# Patient Record
Sex: MALE | Race: WHITE
[De-identification: names, ages, dates, MRNs, and addresses within clinical notes are randomized per-mention and may not be internally consistent; named-entity substitution may affect disease eponyms.]

---

## 2018-10-03 ENCOUNTER — HOSPITAL ENCOUNTER (OUTPATIENT)
Age: 68
Setting detail: OBSERVATION
LOS: 1 days | Discharge: HOME | End: 2018-10-04

## 2018-10-03 ENCOUNTER — HOSPITAL ENCOUNTER (OUTPATIENT)
Dept: HOSPITAL 91 - REC | Age: 68
Setting detail: OBSERVATION
LOS: 1 days | Discharge: HOME | End: 2018-10-04
Payer: MEDICARE

## 2018-10-03 DIAGNOSIS — M48.061: Primary | ICD-10-CM

## 2018-10-03 DIAGNOSIS — Z85.118: ICD-10-CM

## 2018-10-03 PROCEDURE — 88311 DECALCIFY TISSUE: CPT

## 2018-10-03 PROCEDURE — 99217: CPT

## 2018-10-03 PROCEDURE — 72020 X-RAY EXAM OF SPINE 1 VIEW: CPT

## 2018-10-03 PROCEDURE — 86920 COMPATIBILITY TEST SPIN: CPT

## 2018-10-03 PROCEDURE — 85018 HEMOGLOBIN: CPT

## 2018-10-03 PROCEDURE — 97110 THERAPEUTIC EXERCISES: CPT

## 2018-10-03 PROCEDURE — 86901 BLOOD TYPING SEROLOGIC RH(D): CPT

## 2018-10-03 PROCEDURE — 94664 DEMO&/EVAL PT USE INHALER: CPT

## 2018-10-03 PROCEDURE — 88304 TISSUE EXAM BY PATHOLOGIST: CPT

## 2018-10-03 PROCEDURE — 63047 LAM FACETEC & FORAMOT LUMBAR: CPT

## 2018-10-03 PROCEDURE — 86850 RBC ANTIBODY SCREEN: CPT

## 2018-10-03 PROCEDURE — 97161 PT EVAL LOW COMPLEX 20 MIN: CPT

## 2018-10-03 PROCEDURE — 97116 GAIT TRAINING THERAPY: CPT

## 2018-10-03 PROCEDURE — 86900 BLOOD TYPING SEROLOGIC ABO: CPT

## 2018-10-03 PROCEDURE — 85014 HEMATOCRIT: CPT

## 2018-10-03 PROCEDURE — 97530 THERAPEUTIC ACTIVITIES: CPT

## 2018-10-03 PROCEDURE — 80048 BASIC METABOLIC PNL TOTAL CA: CPT

## 2018-10-03 RX ADMIN — CEFAZOLIN 1 MLS/HR: 1 INJECTION, POWDER, FOR SOLUTION INTRAMUSCULAR; INTRAVENOUS at 18:10

## 2018-10-03 RX ADMIN — BACITRACIN 1 ML: 50000 INJECTION, POWDER, FOR SOLUTION INTRAMUSCULAR at 10:51

## 2018-10-03 RX ADMIN — LEVALBUTEROL 1 MG: 1.25 SOLUTION, CONCENTRATE RESPIRATORY (INHALATION) at 13:18

## 2018-10-03 RX ADMIN — ONDANSETRON HYDROCHLORIDE 1 MG: 2 INJECTION, SOLUTION INTRAMUSCULAR; INTRAVENOUS at 18:11

## 2018-10-03 RX ADMIN — ASCORBIC ACID, VITAMIN A PALMITATE, CHOLECALCIFEROL, THIAMINE HYDROCHLORIDE, RIBOFLAVIN-5 PHOSPHATE SODIUM, PYRIDOXINE HYDROCHLORIDE, NIACINAMIDE, DEXPANTHENOL, ALPHA-TOCOPHEROL ACETATE, VITAMIN K1, FOLIC ACID, BIOTIN, CYANOCOBALAMIN 1 MLS/HR: 200; 3300; 200; 6; 3.6; 6; 40; 15; 10; 150; 600; 60; 5 INJECTION, SOLUTION INTRAVENOUS at 16:14

## 2018-10-03 RX ADMIN — FENTANYL CITRATE 1 MCG: 50 INJECTION, SOLUTION INTRAMUSCULAR; INTRAVENOUS at 13:11

## 2018-10-03 RX ADMIN — BENZOCAINE, MENTHOL 1 LOZENGE: 15; 3.6 LOZENGE ORAL at 16:21

## 2018-10-03 RX ADMIN — Medication 1 MG: at 13:26

## 2018-10-03 RX ADMIN — BUPIVACAINE HYDROCHLORIDE 1 ML: 2.5 INJECTION, SOLUTION EPIDURAL; INFILTRATION; INTRACAUDAL; PERINEURAL at 10:51

## 2018-10-03 RX ADMIN — ASCORBIC ACID, VITAMIN A PALMITATE, CHOLECALCIFEROL, THIAMINE HYDROCHLORIDE, RIBOFLAVIN-5 PHOSPHATE SODIUM, PYRIDOXINE HYDROCHLORIDE, NIACINAMIDE, DEXPANTHENOL, ALPHA-TOCOPHEROL ACETATE, VITAMIN K1, FOLIC ACID, BIOTIN, CYANOCOBALAMIN 1 MLS/HR: 200; 3300; 200; 6; 3.6; 6; 40; 15; 10; 150; 600; 60; 5 INJECTION, SOLUTION INTRAVENOUS at 22:58

## 2018-10-03 RX ADMIN — RANITIDINE 1 MG: 150 TABLET ORAL at 21:08

## 2018-10-03 RX ADMIN — HYDROMORPHONE HYDROCHLORIDE 1 MG: 2 INJECTION INTRAMUSCULAR; INTRAVENOUS; SUBCUTANEOUS at 13:18

## 2018-10-03 RX ADMIN — CEFAZOLIN SODIUM 1 MLS/HR: 2 SOLUTION INTRAVENOUS at 10:25

## 2018-10-04 LAB
ANION GAP: 12 (ref 8–16)
BLOOD UREA NITROGEN: 15 MG/DL (ref 7–20)
CALCIUM: 9.1 MG/DL (ref 8.4–10.2)
CARBON DIOXIDE: 26 MMOL/L (ref 21–31)
CHLORIDE: 106 MMOL/L (ref 97–110)
CREATININE: 0.81 MG/DL (ref 0.61–1.24)
GLUCOSE: 112 MG/DL (ref 70–220)
HEMATOCRIT: 36.1 % (ref 42–52)
HEMOGLOBIN: 12.4 G/DL (ref 14–18)
POTASSIUM: 3.8 MMOL/L (ref 3.5–5.1)
SODIUM: 140 MMOL/L (ref 135–144)

## 2018-10-04 RX ADMIN — FERROUS SULFATE TAB 325 MG (65 MG ELEMENTAL FE) 1 MG: 325 (65 FE) TAB at 09:15

## 2018-10-04 RX ADMIN — ASCORBIC ACID, VITAMIN A PALMITATE, CHOLECALCIFEROL, THIAMINE HYDROCHLORIDE, RIBOFLAVIN-5 PHOSPHATE SODIUM, PYRIDOXINE HYDROCHLORIDE, NIACINAMIDE, DEXPANTHENOL, ALPHA-TOCOPHEROL ACETATE, VITAMIN K1, FOLIC ACID, BIOTIN, CYANOCOBALAMIN 1 MLS/HR: 200; 3300; 200; 6; 3.6; 6; 40; 15; 10; 150; 600; 60; 5 INJECTION, SOLUTION INTRAVENOUS at 05:01

## 2018-10-04 RX ADMIN — OXYCODONE HYDROCHLORIDE AND ACETAMINOPHEN 1 MG: 500 TABLET ORAL at 09:15

## 2018-10-04 RX ADMIN — ASCORBIC ACID, VITAMIN A PALMITATE, CHOLECALCIFEROL, THIAMINE HYDROCHLORIDE, RIBOFLAVIN-5 PHOSPHATE SODIUM, PYRIDOXINE HYDROCHLORIDE, NIACINAMIDE, DEXPANTHENOL, ALPHA-TOCOPHEROL ACETATE, VITAMIN K1, FOLIC ACID, BIOTIN, CYANOCOBALAMIN 1 MLS/HR: 200; 3300; 200; 6; 3.6; 6; 40; 15; 10; 150; 600; 60; 5 INJECTION, SOLUTION INTRAVENOUS at 08:58

## 2018-10-04 RX ADMIN — DOCUSATE SODIUM 1 MG: 100 CAPSULE, LIQUID FILLED ORAL at 09:15

## 2018-10-04 RX ADMIN — PANTOPRAZOLE SODIUM 1 MG: 40 TABLET, DELAYED RELEASE ORAL at 05:24

## 2018-10-04 RX ADMIN — CEFAZOLIN 1 MLS/HR: 1 INJECTION, POWDER, FOR SOLUTION INTRAMUSCULAR; INTRAVENOUS at 00:10

## 2018-10-04 RX ADMIN — ZOLPIDEM TARTRATE 1 MG: 5 TABLET, FILM COATED ORAL at 00:10

## 2018-10-04 RX ADMIN — HYDROCODONE BITARTRATE AND ACETAMINOPHEN 1 TAB: 5; 325 TABLET ORAL at 09:16

## 2018-10-04 RX ADMIN — RANITIDINE 1 MG: 150 TABLET ORAL at 09:15

## 2018-10-04 RX ADMIN — CEFAZOLIN 1 MLS/HR: 1 INJECTION, POWDER, FOR SOLUTION INTRAMUSCULAR; INTRAVENOUS at 05:25

## 2018-12-27 ENCOUNTER — HOSPITAL ENCOUNTER (EMERGENCY)
Dept: HOSPITAL 10 - FTE | Age: 68
Discharge: HOME | End: 2018-12-27
Payer: MEDICARE

## 2018-12-27 ENCOUNTER — HOSPITAL ENCOUNTER (EMERGENCY)
Dept: HOSPITAL 91 - FTE | Age: 68
Discharge: HOME | End: 2018-12-27
Payer: MEDICARE

## 2018-12-27 VITALS — DIASTOLIC BLOOD PRESSURE: 77 MMHG | HEART RATE: 56 BPM | RESPIRATION RATE: 20 BRPM | SYSTOLIC BLOOD PRESSURE: 172 MMHG

## 2018-12-27 VITALS
HEIGHT: 68 IN | WEIGHT: 171.3 LBS | WEIGHT: 171.3 LBS | BODY MASS INDEX: 25.96 KG/M2 | BODY MASS INDEX: 25.96 KG/M2 | HEIGHT: 68 IN

## 2018-12-27 DIAGNOSIS — Z85.118: ICD-10-CM

## 2018-12-27 DIAGNOSIS — M79.605: Primary | ICD-10-CM

## 2018-12-27 PROCEDURE — 93971 EXTREMITY STUDY: CPT

## 2018-12-27 PROCEDURE — 99284 EMERGENCY DEPT VISIT MOD MDM: CPT

## 2018-12-27 NOTE — ERD
ER Documentation


Chief Complaint


Chief Complaint





Sent from MD for eval





HPI


68-year-old male who presents with right calf pain.  He was sent here by 


orthopedic to rule out DVT recommending ultrasound.  He is status post 


decompressive laminectomy at L4 with excision of bilateral facet cyst at L4-L5 


which was performed on October 3.  [place]. 


2 days after coming back from traveling he began to have pain in his right lower


extremity and calf.  He also has mild dry cough.  No fever.  No chest pain 


palpitations or shortness of breath.





ROS


All systems reviewed and are negative except as per history of present illness.





Medications


Home Meds


Reported Medications


Omeprazole* (Omeprazole*) 40 Mg Capsule.dr, 40 MG PO DAILY, #30 CAP


   10/2/18





Allergies


Allergies:  


Coded Allergies:  


     No Known Allergy (Verified , 10/3/18)





PMhx/Soc


History of Surgery:  Yes (rt knee repl. laminectomy)


Anesthesia Reaction:  No


Hx Neurological Disorder:  No


Hx Respiratory Disorders:  No


Hx Cardiac Disorders:  No


Hx Psychiatric Problems:  No


Hx Miscellaneous Medical Probl:  Yes (GERD, BPH, lung CA, spinal stenosis )


Hx Alcohol Use:  No


Hx Substance Use:  No


Hx Tobacco Use:  No





FmHx


Family History:  No diabetes





Physical Exam


Vitals





Vital Signs


  Date      Temp  Pulse  Resp  B/P (MAP)   Pulse Ox  O2          O2 Flow    FiO2


Time                                                 Delivery    Rate


  12/27/18  97.5     56    20      172/77        99


     15:00                          (108)





Physical Exam


Const:   No acute distress


Head:   Atraumatic 


Eyes:    Normal Conjunctiva


ENT:    Normal External Ears, Nose and Mouth.


Neck:               Full range of motion. No meningismus.


Resp:   Clear to auscultation bilaterally


Cardio:   Regular rate and rhythm, no murmurs


Right lower extremity, calf is slightly more swollen and indurated when compared


to the left, no erythema or warmth, sensation to light touch is intact





Procedures/MDM


Patient sent here to rule out DVT in the right lower extremity.  DVT ultrasound 


ordered.  Blood pressure elevated otherwise vital signs within normal limits.  


Ultrasound is negative.  I informed the patient's orthopedic doctor and fax him 


a copy as well as provided a copy for the patient.  Patient counseled regarding 


my diagnostic impression and care plan. Prior to discharge all questions 


answered. Pt agrees with treatment plan and understands strict return 


precautions. Pt is instructed to follow up with primary care provider within 24-


48 hours. Precautionary instructions provided including instructions to return 


to the ER if not improving or for any worsening or changing symptoms or 


concerns.





Departure


Diagnosis:  


   Primary Impression:  


   Leg pain


Condition:  Stable











MEGAN VINES PA-C            Dec 27, 2018 17:57

## 2019-06-25 ENCOUNTER — OFFICE (OUTPATIENT)
Dept: URBAN - METROPOLITAN AREA CLINIC 45 | Facility: CLINIC | Age: 69
End: 2019-06-25

## 2019-06-25 VITALS
WEIGHT: 165 LBS | HEIGHT: 69 IN | HEART RATE: 59 BPM | DIASTOLIC BLOOD PRESSURE: 68 MMHG | SYSTOLIC BLOOD PRESSURE: 132 MMHG

## 2019-06-25 DIAGNOSIS — Z86.010 PERSONAL HISTORY COLON POLYPS: ICD-10-CM

## 2019-06-25 DIAGNOSIS — K21.9 ACID REFLUX: ICD-10-CM

## 2019-06-25 PROCEDURE — 99203 OFFICE O/P NEW LOW 30 MIN: CPT | Performed by: INTERNAL MEDICINE

## 2021-04-06 ENCOUNTER — OFFICE (OUTPATIENT)
Dept: URBAN - METROPOLITAN AREA CLINIC 45 | Facility: CLINIC | Age: 71
End: 2021-04-06

## 2021-04-06 VITALS — WEIGHT: 158 LBS | HEIGHT: 69 IN

## 2021-04-06 DIAGNOSIS — K21.9 ACID REFLUX: ICD-10-CM

## 2021-04-06 DIAGNOSIS — Z86.010 PERSONAL HISTORY COLON POLYPS: ICD-10-CM

## 2021-04-06 PROCEDURE — 99214 OFFICE O/P EST MOD 30 MIN: CPT | Performed by: INTERNAL MEDICINE

## 2021-04-06 PROCEDURE — G0406 INPT/TELE FOLLOW UP 15: HCPCS | Performed by: INTERNAL MEDICINE

## 2021-04-06 NOTE — SERVICEHPINOTES
Patient presents for a screening colonoscopy. There has been no previous colon screening. The patient has no family history of colon cancer or other significant GI diseases. Patient denies fever, nausea, vomiting, dysphagia, abdominal pain, change in bowel habits, constipation, diarrhea, rectal bleeding, melena, and significant change in weight. Denies shortness of breath and chest pain.    He does have chronic acid reflux typically using omeprazole 20 mg in the morning with an occasional second dose depending on diet.  He does sleep with an elevated head of the bed.  Last colonoscopy over 10 years ago with one polyp.  He does have occasional prolapsing hemorrhoids.  He has received both vaccinations.

## 2021-05-27 ENCOUNTER — AMBULATORY SURGICAL CENTER (OUTPATIENT)
Dept: URBAN - METROPOLITAN AREA SURGERY 37 | Facility: SURGERY | Age: 71
End: 2021-05-27

## 2021-05-27 VITALS
HEIGHT: 69 IN | SYSTOLIC BLOOD PRESSURE: 141 MMHG | TEMPERATURE: 97.3 F | WEIGHT: 155 LBS | HEART RATE: 60 BPM | DIASTOLIC BLOOD PRESSURE: 79 MMHG

## 2021-05-27 DIAGNOSIS — Z86.010 PERSONAL HISTORY OF COLONIC POLYPS: ICD-10-CM

## 2021-05-27 DIAGNOSIS — K63.5 POLYP OF COLON: ICD-10-CM

## 2021-05-27 DIAGNOSIS — K57.30 DVRTCLOS OF LG INT W/O PERFORATION OR ABSCESS W/O BLEEDING: ICD-10-CM

## 2021-05-27 LAB — SURGICAL: PDF REPORT: (no result)

## 2021-05-27 PROCEDURE — 45385 COLONOSCOPY W/LESION REMOVAL: CPT | Performed by: INTERNAL MEDICINE

## 2021-12-07 ENCOUNTER — OFFICE (OUTPATIENT)
Dept: URBAN - METROPOLITAN AREA CLINIC 45 | Facility: CLINIC | Age: 71
End: 2021-12-07

## 2021-12-07 VITALS — WEIGHT: 158 LBS | HEIGHT: 69 IN

## 2021-12-07 DIAGNOSIS — R10.13 EPIGASTRIC PAIN: ICD-10-CM

## 2021-12-07 DIAGNOSIS — R11.2 NAUSEA & VOMITING: ICD-10-CM

## 2021-12-07 PROCEDURE — G0406 INPT/TELE FOLLOW UP 15: HCPCS | Performed by: INTERNAL MEDICINE

## 2021-12-07 PROCEDURE — 99213 OFFICE O/P EST LOW 20 MIN: CPT | Performed by: INTERNAL MEDICINE

## 2021-12-07 NOTE — SERVICEHPINOTES
The patient was last seen 6 months ago for his colonoscopy.  2 weeks ago he was in [place] and developed a gnawing epigastric pain.  This was followed by dry michael later that night and again the next day ×2.  Symptoms seem to improve over 2 days.  There was no fever or diarrhea.  He is already on omeprazole 20 mg a day which she sometimes supplements with Pepcid.  The discomfort was located in the upper midline.  He has not been using any significant anti-inflammatory medications.  Presently he feels back to normal.

## 2021-12-23 LAB — US ABDOMEN COMPLETE: PDF REPORT: (no result)

## 2023-01-06 ENCOUNTER — OFFICE (OUTPATIENT)
Dept: URBAN - METROPOLITAN AREA CLINIC 45 | Facility: CLINIC | Age: 73
End: 2023-01-06

## 2023-01-06 VITALS
HEART RATE: 62 BPM | WEIGHT: 161 LBS | SYSTOLIC BLOOD PRESSURE: 155 MMHG | HEIGHT: 69 IN | DIASTOLIC BLOOD PRESSURE: 64 MMHG | TEMPERATURE: 98.4 F

## 2023-01-06 DIAGNOSIS — K21.9 ACID REFLUX: ICD-10-CM

## 2023-01-06 PROCEDURE — 99214 OFFICE O/P EST MOD 30 MIN: CPT | Performed by: INTERNAL MEDICINE

## 2023-01-06 RX ORDER — PANTOPRAZOLE SODIUM 40 MG/1
TABLET, DELAYED RELEASE ORAL
Qty: 90 | Status: ACTIVE

## 2023-01-06 NOTE — SERVICEHPINOTES
The patient was last seen by me over a year ago.  Studies reviewed today include an ultrasound from 2021 that which did not reveal gallstones.  Current chief complaints include worsening acid reflux, growling stomach and regurgitation.  Currently he is using omeprazole 20 mg in the morning with occasional Pepcid AC as needed supplemented by over-the-counter antacid products.  His symptoms have been increasing to 3-4 times a week.  He has tried avoiding tomatoes and peppers.  There is no solid food dysphagia or weight loss, and his appetite remains good.  No significant cardiac or pulmonary issues.  History of lung cancer resection with no residual.

## 2023-02-24 ENCOUNTER — OFFICE (OUTPATIENT)
Dept: URBAN - METROPOLITAN AREA CLINIC 45 | Facility: CLINIC | Age: 73
End: 2023-02-24

## 2023-02-24 VITALS — WEIGHT: 161 LBS | HEIGHT: 69 IN

## 2023-02-24 DIAGNOSIS — K21.9 ACID REFLUX: ICD-10-CM

## 2023-02-24 PROCEDURE — 99214 OFFICE O/P EST MOD 30 MIN: CPT | Mod: 95 | Performed by: INTERNAL MEDICINE

## 2023-02-24 NOTE — SERVICEHPINOTES
COLLEEN SERRANO   presents for a follow-up   visit via telehealth.  Patient was last seen on   1/6/2023   . I had changed his medication from over-the-counter omeprazole to pantoprazole 40 mg.  He is using this before breakfast and has not noticed much difference.  He has had to use something else such as Pepcid AC or over-the-counter antacids around dinnertime.  I did review his previous records which showed an endoscopy 6 years ago with [place] grade D esophagitis.  He is also using a wedge pillow.  No solid food dysphagia, vomiting or weight loss.

## 2023-02-24 NOTE — SERVICENOTES
Patient has provided verbal consent for telehealth visit, understand that their insurance will be billed for services provided today and that they may be responsible for payment of associated copayment and deductibles. Synchronous, audio, and visual communication was achieved  for the visit.

## 2023-04-10 ENCOUNTER — AMBULATORY SURGICAL CENTER (OUTPATIENT)
Dept: URBAN - METROPOLITAN AREA SURGERY 37 | Facility: SURGERY | Age: 73
End: 2023-04-10

## 2023-04-10 VITALS
HEIGHT: 69 IN | OXYGEN SATURATION: 97 % | RESPIRATION RATE: 12 BRPM | WEIGHT: 161 LBS | HEART RATE: 57 BPM | DIASTOLIC BLOOD PRESSURE: 94 MMHG | TEMPERATURE: 97.1 F | SYSTOLIC BLOOD PRESSURE: 149 MMHG

## 2023-04-10 DIAGNOSIS — K31.7 POLYP OF STOMACH AND DUODENUM: ICD-10-CM

## 2023-04-10 DIAGNOSIS — R10.13 EPIGASTRIC PAIN: ICD-10-CM

## 2023-04-10 DIAGNOSIS — K44.9 DIAPHRAGMATIC HERNIA WITHOUT OBSTRUCTION OR GANGRENE: ICD-10-CM

## 2023-04-10 DIAGNOSIS — K21.9 GASTRO-ESOPHAGEAL REFLUX DISEASE WITHOUT ESOPHAGITIS: ICD-10-CM

## 2023-04-10 DIAGNOSIS — K22.10 ULCER OF ESOPHAGUS WITHOUT BLEEDING: ICD-10-CM

## 2023-04-10 DIAGNOSIS — K31.89 OTHER DISEASES OF STOMACH AND DUODENUM: ICD-10-CM

## 2023-04-10 LAB — SURGICAL: PDF REPORT: (no result)

## 2023-04-10 PROCEDURE — 43239 EGD BIOPSY SINGLE/MULTIPLE: CPT | Performed by: INTERNAL MEDICINE

## 2023-04-10 NOTE — SERVICEHPINOTES
Patient was last seen on 1/6/2023 . I had changed his medication from over-the-counter omeprazole to pantoprazole 40 mg.  He is using this before breakfast and has not noticed much difference.  He has had to use something else such as Pepcid AC or over-the-counter antacids around dinnertime.  I did review his previous records which showed an endoscopy 6 years ago with [place] grade D esophagitis.  He is also using a wedge pillow.  No solid food dysphagia, vomiting or weight loss.

## 2023-05-03 ENCOUNTER — OFFICE (OUTPATIENT)
Dept: URBAN - METROPOLITAN AREA CLINIC 45 | Facility: CLINIC | Age: 73
End: 2023-05-03

## 2023-05-03 VITALS — HEIGHT: 69 IN

## 2023-05-03 DIAGNOSIS — K22.10 ULCERATIVE ESOPHAGITIS: ICD-10-CM

## 2023-05-03 PROCEDURE — 99213 OFFICE O/P EST LOW 20 MIN: CPT | Mod: 95 | Performed by: INTERNAL MEDICINE

## 2023-05-03 NOTE — SERVICEHPINOTES
The patient had endoscopy with me one month ago.  This revealed grade 4 erosive esophagitis and a small hiatal hernia.  He was having a gnawing stomach discomfort after the endoscopy which he reports went away within a week.  He is now on pantoprazole 40 mg a day and he reports that this is working very well.  Very rare breakthrough symptoms at night and he may use an additional Pepcid.  He is also putting baking soda in his coffee which she states takes away be acidity from the coffee.  He is trying to follow an antireflux diet.  We discussed short and long-term treatment strategies for his condition.

## 2023-08-02 ENCOUNTER — OFFICE (OUTPATIENT)
Dept: URBAN - METROPOLITAN AREA CLINIC 45 | Facility: CLINIC | Age: 73
End: 2023-08-02

## 2023-08-02 VITALS — WEIGHT: 160 LBS | HEIGHT: 69 IN

## 2023-08-02 DIAGNOSIS — K22.10 ULCERATIVE ESOPHAGITIS: ICD-10-CM

## 2023-08-02 PROCEDURE — 99213 OFFICE O/P EST LOW 20 MIN: CPT | Mod: 95 | Performed by: INTERNAL MEDICINE

## 2024-05-14 NOTE — SERVICEHPINOTES
This is my first time assessment this 68-year-old gentleman previously followed by Dr. Cristobal and most recently by Dr. Washburn.  He has had a many year history of acid reflux symptoms.  Exacerbating factors include yoga and dietary indiscretion.  He has undergone a couple of endoscopies in the last 3 years.  I do not have these reports currently.  He is using a wedge pillow which helps as well as a careful diet which also helps.  This includes eliminating the usual foods as well as coffee and chocolate.  He currently is using omeprazole 20 mg twice a day with occasional additional over-the-counter antacids.  This regimen seems to keep him in pretty stable condition.   He is also asking when he is due for follow-up colonoscopy.  His most recent report was done elsewhere and he will try to get it for me to review.
134.9

## 2024-10-02 ENCOUNTER — OFFICE (OUTPATIENT)
Dept: URBAN - METROPOLITAN AREA CLINIC 45 | Facility: CLINIC | Age: 74
End: 2024-10-02

## 2024-10-02 VITALS — HEIGHT: 69 IN

## 2024-10-02 DIAGNOSIS — K85.1 PANCREATITIS, ACUTE, BILIARY: ICD-10-CM

## 2024-10-02 PROCEDURE — 99214 OFFICE O/P EST MOD 30 MIN: CPT | Mod: 95 | Performed by: INTERNAL MEDICINE

## 2024-10-02 NOTE — SERVICEHPINOTES
Patient was last seen by me over a year ago for endoscopy for erosive esophagitis.  He reports that 2 days ago he was feeling very poorly and had an upset stomach and took Pepto-Bismol.  He then got recurrent nausea and vomiting.  When this did not improve he went to the [facility] emergency room.  He had a shot one week prior.  Evaluation the patient forwarded to me showed several laboratory abnormalities.  White count 2.8.  Lipase 3492, bilirubin 2.4, ,  and alkaline phosphatase 132.  He has a very limited lifetime history of alcohol intake and currently drinks only one beer a week.  Ultrasound and CT scan suggested possibly gallbladder wall thickening but no clear stones were identified. 1 Study of the Common Bile Duct Was 9 Mm No Stones Were Noted.  No Pancreatitis Was Noted on CT Scan.Today he feels much better and is eating a soft diet without problems.  Denies fever or jaundice.

## 2024-10-02 NOTE — SERVICENOTES
I personally reviewed:  laboratory results,  endoscopic procedure reports, endoscopy images, pathology reports, imaging studies, Patient is in [place] and  has provided verbal consent for telehealth visit, understands that their insurance will be billed for services provided today and that they may be responsible for payment of associated copayment and deductibles. Synchronous, audio, and visual communication was achieved for the visit.

## 2024-10-04 LAB
AMYLASE: 60 U/L (ref 21–101)
COMPREHENSIVE METABOLIC PANEL: ALBUMIN/GLOBULIN RATIO: 1.3 (CALC) (ref 1–2.5)
COMPREHENSIVE METABOLIC PANEL: ALBUMIN: 3.5 G/DL — LOW (ref 3.6–5.1)
COMPREHENSIVE METABOLIC PANEL: ALKALINE PHOSPHATASE: 222 U/L — HIGH (ref 35–144)
COMPREHENSIVE METABOLIC PANEL: ALT: 158 U/L — HIGH (ref 9–46)
COMPREHENSIVE METABOLIC PANEL: AST: 48 U/L — HIGH (ref 10–35)
COMPREHENSIVE METABOLIC PANEL: BILIRUBIN, TOTAL: 1 MG/DL (ref 0.2–1.2)
COMPREHENSIVE METABOLIC PANEL: BUN/CREATININE RATIO: (no result) (CALC)
COMPREHENSIVE METABOLIC PANEL: CALCIUM: 9.5 MG/DL (ref 8.6–10.3)
COMPREHENSIVE METABOLIC PANEL: CARBON DIOXIDE: 26 MMOL/L (ref 20–32)
COMPREHENSIVE METABOLIC PANEL: CHLORIDE: 105 MMOL/L (ref 98–110)
COMPREHENSIVE METABOLIC PANEL: CREATININE: 0.91 MG/DL (ref 0.7–1.28)
COMPREHENSIVE METABOLIC PANEL: EGFR: 88 ML/MIN/1.73M2 (ref 60–?)
COMPREHENSIVE METABOLIC PANEL: GLOBULIN: 2.8 G/DL (CALC) (ref 1.9–3.7)
COMPREHENSIVE METABOLIC PANEL: GLUCOSE: 106 MG/DL (ref 65–139)
COMPREHENSIVE METABOLIC PANEL: POTASSIUM: 4.6 MMOL/L (ref 3.5–5.3)
COMPREHENSIVE METABOLIC PANEL: PROTEIN, TOTAL: 6.3 G/DL (ref 6.1–8.1)
COMPREHENSIVE METABOLIC PANEL: SODIUM: 137 MMOL/L (ref 135–146)
COMPREHENSIVE METABOLIC PANEL: UREA NITROGEN (BUN): 23 MG/DL (ref 7–25)
LIPASE: 83 U/L — HIGH (ref 7–60)

## 2024-10-24 ENCOUNTER — OFFICE (OUTPATIENT)
Dept: URBAN - METROPOLITAN AREA CLINIC 45 | Facility: CLINIC | Age: 74
End: 2024-10-24

## 2024-10-24 VITALS — HEIGHT: 69 IN

## 2024-10-24 DIAGNOSIS — K85.1 PANCREATITIS, ACUTE, BILIARY: ICD-10-CM

## 2024-10-24 PROCEDURE — 99213 OFFICE O/P EST LOW 20 MIN: CPT | Mod: 95 | Performed by: INTERNAL MEDICINE

## 2024-10-24 NOTE — SERVICEHPINOTES
The patient is seen to review results and follow-up of pancreatitis.  Originally when seen at the [place] emergency room on September 30 lipase was 3492.  Alkaline phosphatase 142, , , bilirubin 2.6.  Because of these alarming findings, I repeated his labs on October 3 and obtained an MRCP.  The imaging study was normal without evidence of retained stones.  Repeat labs showed alkaline phosphatase 222, AST 48, , bilirubin 1.0, lipase 83.  Therefore everything except for her alkaline phosphatase was improving.  He has remained essentially asymptomatic over the last few weeks.

## 2024-11-07 LAB
HEPATIC FUNCTION PANEL: ALBUMIN/GLOBULIN RATIO: 1.6 (CALC) (ref 1–2.5)
HEPATIC FUNCTION PANEL: ALBUMIN: 4.1 G/DL (ref 3.6–5.1)
HEPATIC FUNCTION PANEL: ALKALINE PHOSPHATASE: 92 U/L (ref 35–144)
HEPATIC FUNCTION PANEL: ALT: 20 U/L (ref 9–46)
HEPATIC FUNCTION PANEL: AST: 21 U/L (ref 10–35)
HEPATIC FUNCTION PANEL: BILIRUBIN, DIRECT: 0.1 MG/DL (ref ?–0.2)
HEPATIC FUNCTION PANEL: BILIRUBIN, INDIRECT: 0.3 MG/DL (CALC) (ref 0.2–1.2)
HEPATIC FUNCTION PANEL: BILIRUBIN, TOTAL: 0.4 MG/DL (ref 0.2–1.2)
HEPATIC FUNCTION PANEL: GLOBULIN: 2.5 G/DL (CALC) (ref 1.9–3.7)
HEPATIC FUNCTION PANEL: PROTEIN, TOTAL: 6.6 G/DL (ref 6.1–8.1)

## 2024-12-04 ENCOUNTER — OFFICE (OUTPATIENT)
Dept: URBAN - METROPOLITAN AREA CLINIC 45 | Facility: CLINIC | Age: 74
End: 2024-12-04

## 2024-12-04 VITALS — HEIGHT: 69 IN | WEIGHT: 160 LBS

## 2024-12-04 DIAGNOSIS — K85.1 PANCREATITIS, ACUTE, BILIARY: ICD-10-CM

## 2024-12-04 DIAGNOSIS — K22.10 ULCERATIVE ESOPHAGITIS: ICD-10-CM

## 2024-12-04 DIAGNOSIS — K44.9 DIAPHRAGMATIC HERNIA WITHOUT OBSTRUCTION OR GANGRENE: ICD-10-CM

## 2024-12-04 PROCEDURE — 99214 OFFICE O/P EST MOD 30 MIN: CPT | Mod: 95 | Performed by: INTERNAL MEDICINE

## 2024-12-04 PROCEDURE — G2211 COMPLEX E/M VISIT ADD ON: HCPCS | Performed by: INTERNAL MEDICINE

## 2024-12-04 NOTE — SERVICEHPINOTES
The patient has been followed by me since September after an episode of significant pancreatitis.  Etiology was never established but it was consistent with a biliary cause as his liver tests have gone up markedly.  On last laboratories done November 6 have now completely normalized.  AST 21, ALT 20 and alkaline phosphatase 92.  He is having some ongoing symptoms of gastroesophageal reflux disease.  Upper endoscopy April 2023 showed [place] class 4 erosive esophagitis.  He has been using omeprazole 40 mg in the morning and may need a second dose of another medication to prevent nocturnal symptoms.  He is inquiring about a follow-up endoscopy.

## 2025-01-31 ENCOUNTER — AMBULATORY SURGICAL CENTER (OUTPATIENT)
Dept: URBAN - METROPOLITAN AREA SURGERY 41 | Facility: SURGERY | Age: 75
End: 2025-01-31

## 2025-01-31 VITALS
WEIGHT: 160 LBS | TEMPERATURE: 97 F | SYSTOLIC BLOOD PRESSURE: 141 MMHG | HEART RATE: 58 BPM | DIASTOLIC BLOOD PRESSURE: 62 MMHG | HEIGHT: 69 IN

## 2025-01-31 DIAGNOSIS — K44.9 DIAPHRAGMATIC HERNIA WITHOUT OBSTRUCTION OR GANGRENE: ICD-10-CM

## 2025-01-31 DIAGNOSIS — K22.10 ULCER OF ESOPHAGUS WITHOUT BLEEDING: ICD-10-CM

## 2025-01-31 DIAGNOSIS — K25.9 GASTRIC ULCER, UNSPECIFIED AS ACUTE OR CHRONIC, WITHOUT HEMO: ICD-10-CM

## 2025-01-31 DIAGNOSIS — K21.9 GASTRO-ESOPHAGEAL REFLUX DISEASE WITHOUT ESOPHAGITIS: ICD-10-CM

## 2025-01-31 PROCEDURE — 43239 EGD BIOPSY SINGLE/MULTIPLE: CPT | Performed by: INTERNAL MEDICINE

## 2025-01-31 NOTE — SERVICEHPINOTES
The patient has been followed by me since September after an episode of significant pancreatitis.  Etiology was never established but it was consistent with a biliary cause as his liver tests have gone up markedly.  On last laboratories done November 6 have now completely normalized.  AST 21, ALT 20 and alkaline phosphatase 92.  He is having some ongoing symptoms of gastroesophageal reflux disease.  Upper endoscopy April 2023 showed [place] class 4 erosive esophagitis.  He has been using omeprazole 40 mg in the morning and may need a second dose of another medication to prevent nocturnal symptoms.  He is inquiring about a follow-up endoscopy.

## 2025-03-26 NOTE — SERVICENOTES
Patient is in [place] and  has provided verbal consent for telehealth visit, understands that their insurance will be billed for services provided today and that they may be responsible for payment of associated copayment and deductibles. Synchronous, audio, and visual communication was achieved for the visit. c/w meds as prescribed